# Patient Record
Sex: FEMALE | URBAN - METROPOLITAN AREA
[De-identification: names, ages, dates, MRNs, and addresses within clinical notes are randomized per-mention and may not be internally consistent; named-entity substitution may affect disease eponyms.]

---

## 2018-08-13 ENCOUNTER — APPOINTMENT (RX ONLY)
Dept: URBAN - METROPOLITAN AREA CLINIC 23 | Facility: CLINIC | Age: 68
Setting detail: DERMATOLOGY
End: 2018-08-13

## 2018-08-13 DIAGNOSIS — L40.0 PSORIASIS VULGARIS: ICD-10-CM

## 2018-08-13 DIAGNOSIS — L82.1 OTHER SEBORRHEIC KERATOSIS: ICD-10-CM

## 2018-08-13 DIAGNOSIS — Z85.828 PERSONAL HISTORY OF OTHER MALIGNANT NEOPLASM OF SKIN: ICD-10-CM

## 2018-08-13 DIAGNOSIS — Z41.9 ENCOUNTER FOR PROCEDURE FOR PURPOSES OTHER THAN REMEDYING HEALTH STATE, UNSPECIFIED: ICD-10-CM

## 2018-08-13 PROBLEM — L57.0 ACTINIC KERATOSIS: Status: ACTIVE | Noted: 2018-08-13

## 2018-08-13 PROCEDURE — ? BOTOX

## 2018-08-13 PROCEDURE — ? PRESCRIPTION

## 2018-08-13 PROCEDURE — ? COUNSELING

## 2018-08-13 PROCEDURE — ? PULSED-DYE LASER

## 2018-08-13 PROCEDURE — 99213 OFFICE O/P EST LOW 20 MIN: CPT | Mod: 25

## 2018-08-13 PROCEDURE — ? INTRALESIONAL KENALOG

## 2018-08-13 PROCEDURE — 11900 INJECT SKIN LESIONS </W 7: CPT

## 2018-08-13 PROCEDURE — ? FILLERS

## 2018-08-13 PROCEDURE — ? SUNSCREEN RECOMMENDATIONS

## 2018-08-13 RX ORDER — CALCIPOTRIENE 50 UG/G
OINTMENT TOPICAL
Qty: 1 | Refills: 2 | Status: ERX | COMMUNITY
Start: 2018-08-13

## 2018-08-13 RX ORDER — CLOBETASOL PROPIONATE 0.46 MG/ML
SOLUTION TOPICAL
Qty: 1 | Refills: 2 | Status: ERX | COMMUNITY
Start: 2018-08-13

## 2018-08-13 RX ADMIN — CLOBETASOL PROPIONATE: 0.46 SOLUTION TOPICAL at 20:40

## 2018-08-13 RX ADMIN — CALCIPOTRIENE: 50 OINTMENT TOPICAL at 20:42

## 2018-08-13 ASSESSMENT — LOCATION SIMPLE DESCRIPTION DERM
LOCATION SIMPLE: LEFT CHEEK
LOCATION SIMPLE: POSTERIOR SCALP

## 2018-08-13 ASSESSMENT — LOCATION ZONE DERM
LOCATION ZONE: FACE
LOCATION ZONE: SCALP

## 2018-08-13 ASSESSMENT — LOCATION DETAILED DESCRIPTION DERM
LOCATION DETAILED: LEFT OCCIPITAL SCALP
LOCATION DETAILED: LEFT INFERIOR OCCIPITAL SCALP
LOCATION DETAILED: LEFT CENTRAL MALAR CHEEK

## 2018-08-13 NOTE — PROCEDURE: FILLERS
Brows Filler  Volume In Cc: 0
Additional Area 1 Location: nasal labial folds/vermillion boarder
Include Cannula Information In Note?: No
Filler: Virginia Clarke
Expiration Date (Month Year): 01/2021
Additional Area 2 Location: temples using an acannula
Anesthesia Type: 1% lidocaine without epinephrine
Additional Anesthesia Volume In Cc: 6
Consent: Written consent obtained. Risks include but not limited to bruising, beading, irregular texture, ulceration, infection, allergic reaction, scar formation, incomplete augmentation, temporary nature, procedural pain.
Post-Care Instructions: Patient instructed to apply ice to reduce swelling.
Price (Use Numbers Only, No Special Characters Or $): 0.00
Detail Level: Zone
Expiration Date (Month Year): 02/2020
Additional Area 5 Location: NLFs,tear trough  and lips
Additional Area 2 Volume In Cc: 1
Lot #: [id number]
Lot #: [id number]
Additional Area 2 Location: cheeks, lateral jawlines, lateral mouth
Map Statment: See [address] for Complete Details
Expiration Date (Month Year): 01/31/2021
Additional Area 4 Location: cheeks/Marionette
Additional Area 3 Location: lateral eyes, nasal root
Anesthesia Volume In Cc: 0.2
Additional Area 1 Location: lateral jawline, lateral cheeks, marionettte lines, lateral mouth
Lot #: [id number] [facility]
Additional Area 1 Location: NLF's, lateral mouth,

## 2018-08-13 NOTE — PROCEDURE: PULSED-DYE LASER
Consent: Written consent obtained, risks reviewed including but not limited to crusting, scabbing, blistering, scarring, darker or lighter pigmentary change, incidental hair removal, bruising, and/or incomplete removal.
Spot Size: 10 mm
Price (Use Numbers Only, No Special Characters Or $): 0.00
Delay Time (Ms): [facility]
Cryogen Time (Ms): [address] [address] Cota
Laser Type: Vbeam 595nm
Spot Size: 7 mm
Fluence In J/Cm2 (Optional): 6.50
Spot Size: 10x3 mm
Delay Time (Ms): 20
Location (Required For Details To Render In Note But Body Touch Will Also Count For First Location): perioral area
Treated Area: medium area
Delay Time (Ms): 10
Location (Required For Details To Render In Note): nose
Spot Size: 5 mm
Post-Care Instructions: I reviewed with the patient in detail post-care instructions. Patient should stay away from the sun and wear sun protection until treated areas are fully healed.
Fluence In J/Cm2 (Optional): 10.0
Treated Area: small area
Detail Level: Simple
Fluence In J/Cm2 (Optional): 7.00
Cryogen Time (Ms): 27
Pulse Duration: 6 ms
Pulse Duration: 10 ms
Pulse Duration: 1.5 ms
Cryogen Time (Ms): 30
Post-Procedure Care: Sunscreen applied. Post care reviewed with patient.

## 2018-08-13 NOTE — PROCEDURE: BOTOX
Levator Labii Superioris Units: 0
Periorbital Skin Units: 24
Detail Level: Zone
Additional Area 6 Location: glabella
Dilution (U/0.1 Cc): 2.5
Expiration Date (Month Year): 01/2021
Additional Area 4 Location: bunny lines
Price (Use Numbers Only, No Special Characters Or $): 0.00
Lot #: [id number]
Additional Area 1 Location: chin
Consent: Written consent obtained. Risks include but not limited to lid/brow ptosis, bruising, swelling, diplopia, temporary effect, incomplete chemical denervation.
Additional Area 3 Location: lateral eyes
Additional Area 5 Location: lateral brows
Additional Area 2 Location: high forehead 2.5 cm above brows

## 2018-08-13 NOTE — PROCEDURE: INTRALESIONAL KENALOG
Ndc# For Kenalog Only: [id number]
Lot # (Optional): [id number]
Detail Level: Simple
X Size Of Lesion In Cm (Optional): 0
Kenalog Preparation: Kenalog in bacteriostatic water
Medical Necessity Clause: This procedure was medically necessary because the lesions that were treated were:
Include Z78.9 (Other Specified Conditions Influencing Health Status) As An Associated Diagnosis?: No
Administered By (Optional): Dr. Aquino Or
Concentration Of Solution Injected (Mg/Ml): 5.0
Total Volume Injected (Ccs- Only Use Numbers And Decimals): 0.2
Expiration Date (Optional): 12/19
Consent: The risks of atrophy were reviewed with the patient.

## 2018-10-09 ENCOUNTER — RX ONLY (OUTPATIENT)
Age: 68
Setting detail: RX ONLY
End: 2018-10-09

## 2018-10-09 RX ORDER — CALCIPOTRIENE AND BETAMETHASONE DIPROPIONATE 50; .5 UG/G; MG/G
OINTMENT TOPICAL
Qty: 1 | Refills: 2 | Status: ERX | COMMUNITY
Start: 2018-10-09